# Patient Record
Sex: FEMALE | Race: WHITE | NOT HISPANIC OR LATINO | Employment: OTHER | URBAN - NONMETROPOLITAN AREA
[De-identification: names, ages, dates, MRNs, and addresses within clinical notes are randomized per-mention and may not be internally consistent; named-entity substitution may affect disease eponyms.]

---

## 2018-03-27 ENCOUNTER — OFFICE VISIT (OUTPATIENT)
Dept: OTOLARYNGOLOGY | Facility: CLINIC | Age: 58
End: 2018-03-27

## 2018-03-27 VITALS — WEIGHT: 198 LBS | BODY MASS INDEX: 27.72 KG/M2 | HEIGHT: 71 IN

## 2018-03-27 DIAGNOSIS — H92.03 OTALGIA OF BOTH EARS: Primary | ICD-10-CM

## 2018-03-27 PROCEDURE — 99203 OFFICE O/P NEW LOW 30 MIN: CPT | Performed by: OTOLARYNGOLOGY

## 2018-03-27 RX ORDER — LOSARTAN POTASSIUM AND HYDROCHLOROTHIAZIDE 25; 100 MG/1; MG/1
TABLET ORAL
COMMUNITY
Start: 2016-12-24

## 2018-03-28 NOTE — PROGRESS NOTES
Subjective   Dalila Kebede is a 57 y.o. female.     History of Present Illness   Patient states that for the last 2 years she has had intermittent problems with ear pain.  She's been told on multiple occasion she has fluid on her ears.  Has been treated with nasal spray which does seem to help.  No change in her hearing however even with acute flareups.  No otorrhea.  Primary symptom is intermittent pain that can last from hours to days.  No dental issues that she is aware of.  No previous otologic surgery.  She has been treated with Flonase, Astelin, and Cortisporin in the past.  Says her ears are not hurting at this point.      The following portions of the patient's history were reviewed and updated as appropriate: allergies, current medications, past family history, past medical history, past social history, past surgical history and problem list.      Dalila Kebede reports that she has never smoked. She has never used smokeless tobacco.  Patient is not a tobacco user and has not been counseled for use of tobacco products    No family history on file.    Allergies   Allergen Reactions   • Lisinopril Hives   • Lodine [Etodolac] Hives   • Naproxen Hives         Current Outpatient Prescriptions:   •  losartan-hydrochlorothiazide (HYZAAR) 100-25 MG per tablet, TAKE 1 TABLET BY MOUTH EVERY DAY, Disp: , Rfl:     Past Medical History:   Diagnosis Date   • Hypertension          Review of Systems   Constitutional: Positive for appetite change.   HENT: Positive for ear pain.    All other systems reviewed and are negative.          Objective   Physical Exam  General: Well-developed well-nourished female in no acute distress.  Alert and oriented ×3. Head: Normocephalic. Face: Symmetrical strength and appearance. PERRL. Voice:Strong. Speech:Fluent  Ears: External ears no deformity, canals no discharge, tympanic membranes intact clear and mobile bilaterally.  Nose: Nares show no discharge mass polyp or purulence.   Boggy mucosa is present.  No gross external deformity.  Septum: Midline  Oral cavity: Lips and gums without lesions.  Tongue and floor of mouth without lesions.  Parotid and submandibular ducts unobstructed.  No mucosal lesions on the buccal mucosa or vestibule of the mouth.  Pharynx: No erythema exudate mass or ulcer  Neck: No lymphadenopathy.  No thyromegaly.  Trachea and larynx midline.  No masses in the parotid or submandibular glands.  TMJs are tender to palpation bilaterally.        Assessment/Plan   Dalila was seen today for ear problem.    Diagnoses and all orders for this visit:    Otalgia of both ears        Plan: Explained to the patient that her ear pain appears to be likely musculoskeletal in nature.  I've advise soft diet and use of anti-inflammatory medicine as needed but also told her that I would like her to call me the next time she has a significant flareup and I will evaluate her while she is symptomatic to make sure there is not other pathology at that time.  She is agreeable.

## 2018-07-10 PROCEDURE — 87077 CULTURE AEROBIC IDENTIFY: CPT | Performed by: NURSE PRACTITIONER

## 2018-07-10 PROCEDURE — 87186 SC STD MICRODIL/AGAR DIL: CPT | Performed by: NURSE PRACTITIONER

## 2018-07-10 PROCEDURE — 87086 URINE CULTURE/COLONY COUNT: CPT | Performed by: NURSE PRACTITIONER
